# Patient Record
Sex: FEMALE | URBAN - METROPOLITAN AREA
[De-identification: names, ages, dates, MRNs, and addresses within clinical notes are randomized per-mention and may not be internally consistent; named-entity substitution may affect disease eponyms.]

---

## 2019-06-13 ENCOUNTER — APPOINTMENT (OUTPATIENT)
Dept: URBAN - METROPOLITAN AREA CLINIC 236 | Age: 39
Setting detail: DERMATOLOGY
End: 2019-06-15

## 2019-06-13 DIAGNOSIS — L21.8 OTHER SEBORRHEIC DERMATITIS: ICD-10-CM

## 2019-06-13 PROBLEM — L81.5 LEUKODERMA, NOT ELSEWHERE CLASSIFIED: Status: ACTIVE | Noted: 2019-06-13

## 2019-06-13 PROCEDURE — OTHER ADDITIONAL NOTES: OTHER

## 2019-06-13 PROCEDURE — OTHER TREATMENT REGIMEN: OTHER

## 2019-06-13 PROCEDURE — OTHER COUNSELING: OTHER

## 2019-06-13 PROCEDURE — 99203 OFFICE O/P NEW LOW 30 MIN: CPT

## 2019-06-13 ASSESSMENT — LOCATION ZONE DERM: LOCATION ZONE: FACE

## 2019-06-13 ASSESSMENT — LOCATION DETAILED DESCRIPTION DERM: LOCATION DETAILED: INFERIOR MID FOREHEAD

## 2019-06-13 ASSESSMENT — LOCATION SIMPLE DESCRIPTION DERM: LOCATION SIMPLE: INFERIOR FOREHEAD

## 2019-06-13 NOTE — HPI: RASH
What Type Of Note Output Would You Prefer (Optional)?: Bullet Format
How Severe Is Your Rash?: moderate
Is This A New Presentation, Or A Follow-Up?: Rash
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 Successfully treated with an unknown cream.

## 2019-06-13 NOTE — PROCEDURE: TREATMENT REGIMEN
Detail Level: Detailed
Initiate Treatment: Sun protection\\nHydrocortisone 1% cream twice daily as needed

## 2019-06-13 NOTE — PROCEDURE: ADDITIONAL NOTES
Detail Level: Detailed
Additional Notes: Per pt this happened approximately 7 years ago, was given a topical to use and told to go out in the sun.